# Patient Record
(demographics unavailable — no encounter records)

---

## 2024-10-09 NOTE — HISTORY OF PRESENT ILLNESS
[de-identified] : Update 10/10/24: f/u CRS and DNS s/p STESS 8/1/24. Rinsing QD. Previously recommended OTC antihistamine for eye itching.   Update 8/29/24: CRS and DNS s/p STESS 8/1/24. Rinsing TID. Reports frontal headaches but has not needed any pain medication. Good sense of smell. Breathing well. Has eye itching and dryness, was told by ophtho it was allergy. Not taking any allergy treatment. Path: 1.  Nasal cavity, septal contents, septoplasty - Cartilage and bone 2.  Paranasal sinus, left sinus contents, endoscopic sinus surgery - Respiratory mucosa with mild chronic inflammation 3. Paranasal sinus, right sinus contents, endoscopic sinus surgery - Respiratory mucosa with mild chronic inflammation  Update 8/8/24: s/p STESS 8/1/24. Reports appropriate post-op congestion. Denies pain, recent fevers or infections.   Pathology: pending  64M referred by Dr. Fuentes for CRS. Patient has hx of balloon sinuplasty in 2/2023 but no longer with relief. Patient has tried flonase, budesonide irrigations and OTC antihistamines without relief. CT reviewed showing diffuse pansinusitis, DNS and large luis carlos bullosa. He reports for the past 6 years it has progressively worsened. He has environmental allergies and breathes through his mouth.

## 2024-10-09 NOTE — REASON FOR VISIT
[Subsequent Evaluation] : a subsequent evaluation for [FreeTextEntry2] : referred by Dr. Fuentes for CRS

## 2024-10-09 NOTE — ASSESSMENT
[FreeTextEntry1] : 65M presenting with CRS, AR, DNS and R luis carlos s/p STESS 8/1/24, doing well.

## 2024-10-09 NOTE — PROCEDURE
[FreeTextEntry6] : Bilateral nasal cavity debridement (CPT 79930)  Indication: post op   Procedure:  There was expected post operative inflammation in both the right and left nasal cavity. Thick mucoid secretions and blood clot were suctioned  Left:  The septum is midline The inferior turbinate was well reduced/outfractured The MT was resected The max with a small crust, removed with suction adn forceps minimal edema along ethmoid skull base sphenoid with post obstructive mucus, suctioned The frontal outflow tract clear  Right:  The septum is midline The inferior turbinate was well reduced/outfractured The MT was resected The max with small crust, removed with suction and forceps, mild polypoid edema within sinus ethmoid with polypoid edema removed with suction sphenoid clear The frontal outflow tract clear

## 2024-10-24 NOTE — ASSESSMENT
[FreeTextEntry1] : 65M presenting with CRS, AR, DNS and R luis carlos s/p STESS 8/1/24, doing well. Also with tinntus.

## 2024-10-24 NOTE — PROCEDURE
[FreeTextEntry6] : Bilateral nasal cavity endoscopu  Indication: post op   Left:  The septum is midline The inferior turbinate was well reduced/outfractured The MT was resected The max clear ethmoid clear sphenoid clear The frontal outflow tract clear  Right:  The septum is midline The inferior turbinate was well reduced/outfractured The MT was resected The max clear ethmoid clear sphenoid clear The frontal outflow tract clear

## 2024-10-24 NOTE — PLAN
[TextEntry] : His sinuses look very good today. He does have some mild persistent congestion. We will begin budesonide irrigations BID.   In addition, he reports tinnitus L>R. Audio shows sloping mixed severe loss. I offered fu with otology and audiology but he is not interested at this time. We reviewed risks of unaided hearing loss.  Fu 2 months.

## 2024-10-24 NOTE — HISTORY OF PRESENT ILLNESS
[de-identified] : Update 10/24/24: f/u CRS and DNS s/p STESS 8/1/24. Rinsing QD. He is feeling well. Good sense of smell. Breathing well through nose, although does have some persistent congestion. Also with 3-4 years of L>R tinnitus.  Update 8/29/24: CRS and DNS s/p STESS 8/1/24. Rinsing TID. Reports frontal headaches but has not needed any pain medication. Good sense of smell. Breathing well. Has eye itching and dryness, was told by ophtho it was allergy. Not taking any allergy treatment. Path: 1.  Nasal cavity, septal contents, septoplasty - Cartilage and bone 2.  Paranasal sinus, left sinus contents, endoscopic sinus surgery - Respiratory mucosa with mild chronic inflammation 3. Paranasal sinus, right sinus contents, endoscopic sinus surgery - Respiratory mucosa with mild chronic inflammation  Update 8/8/24: s/p STESS 8/1/24. Reports appropriate post-op congestion. Denies pain, recent fevers or infections.   Pathology: pending  64M referred by Dr. Fuentes for CRS. Patient has hx of balloon sinuplasty in 2/2023 but no longer with relief. Patient has tried flonase, budesonide irrigations and OTC antihistamines without relief. CT reviewed showing diffuse pansinusitis, DNS and large luis carlos bullosa. He reports for the past 6 years it has progressively worsened. He has environmental allergies and breathes through his mouth.

## 2025-01-09 NOTE — PROCEDURE
[de-identified] : Vikas Ibarra [de-identified] : Bilateral nasal cavity endoscopy  Indication: post op   Left:  The septum is midline The inferior turbinate was well reduced/outfractured The MT was resected The max clear ethmoid clear sphenoid clear The frontal outflow tract clear  Right:  The septum is midline The inferior turbinate was well reduced/outfractured The MT was resected The max clear ethmoid clear sphenoid clear The frontal outflow tract clear

## 2025-01-09 NOTE — HISTORY OF PRESENT ILLNESS
[de-identified] : Update 1/9/25: f/u CRS and DNS s/p STESS 8/1/24. Was started on budesonide irrigations BID, which he did get from the pharmacy. He is irrigating with saline 1-2x daily. Good sense of smell, no infections. He reports tinnitus persists L>R. Reviewed audio from last visit. He cannot afford hearing aids.  Update 10/24/24: f/u CRS and DNS s/p STESS 8/1/24. Rinsing QD. He is feeling well. Good sense of smell. Breathing well through nose, although does have some persistent congestion. Also with 3-4 years of L>R tinnitus.  Update 8/29/24: CRS and DNS s/p STESS 8/1/24. Rinsing TID. Reports frontal headaches but has not needed any pain medication. Good sense of smell. Breathing well. Has eye itching and dryness, was told by ophtho it was allergy. Not taking any allergy treatment. Path: 1.  Nasal cavity, septal contents, septoplasty - Cartilage and bone 2.  Paranasal sinus, left sinus contents, endoscopic sinus surgery - Respiratory mucosa with mild chronic inflammation 3. Paranasal sinus, right sinus contents, endoscopic sinus surgery - Respiratory mucosa with mild chronic inflammation  Update 8/8/24: s/p STESS 8/1/24. Reports appropriate post-op congestion. Denies pain, recent fevers or infections.   Pathology: pending  64M referred by Dr. Fuentes for CRS. Patient has hx of balloon sinuplasty in 2/2023 but no longer with relief. Patient has tried flonase, budesonide irrigations and OTC antihistamines without relief. CT reviewed showing diffuse pansinusitis, DNS and large luis carlos bullosa. He reports for the past 6 years it has progressively worsened. He has environmental allergies and breathes through his mouth.

## 2025-01-09 NOTE — PHYSICAL EXAM
[Nasal Endoscopy Performed] : nasal endoscopy was performed, see procedure section for findings [Normal] : mucosa is normal [Midline] : trachea located in midline position [de-identified] : cerumen impaction cleared bilaterally

## 2025-01-09 NOTE — PHYSICAL EXAM
[Nasal Endoscopy Performed] : nasal endoscopy was performed, see procedure section for findings [Normal] : mucosa is normal [Midline] : trachea located in midline position [de-identified] : cerumen impaction cleared bilaterally

## 2025-01-09 NOTE — HISTORY OF PRESENT ILLNESS
1213-Patient tolerated treatment well, peripheral IV was flushed and removed, catheter tip intact. Discharged without complaints or S/S of adverse event. Instructed to call provider for any questions or concerns.      [de-identified] : Update 1/9/25: f/u CRS and DNS s/p STESS 8/1/24. Was started on budesonide irrigations BID, which he did get from the pharmacy. He is irrigating with saline 1-2x daily. Good sense of smell, no infections. He reports tinnitus persists L>R. Reviewed audio from last visit. He cannot afford hearing aids.  Update 10/24/24: f/u CRS and DNS s/p STESS 8/1/24. Rinsing QD. He is feeling well. Good sense of smell. Breathing well through nose, although does have some persistent congestion. Also with 3-4 years of L>R tinnitus.  Update 8/29/24: CRS and DNS s/p STESS 8/1/24. Rinsing TID. Reports frontal headaches but has not needed any pain medication. Good sense of smell. Breathing well. Has eye itching and dryness, was told by ophtho it was allergy. Not taking any allergy treatment. Path: 1.  Nasal cavity, septal contents, septoplasty - Cartilage and bone 2.  Paranasal sinus, left sinus contents, endoscopic sinus surgery - Respiratory mucosa with mild chronic inflammation 3. Paranasal sinus, right sinus contents, endoscopic sinus surgery - Respiratory mucosa with mild chronic inflammation  Update 8/8/24: s/p STESS 8/1/24. Reports appropriate post-op congestion. Denies pain, recent fevers or infections.   Pathology: pending  64M referred by Dr. Fuentes for CRS. Patient has hx of balloon sinuplasty in 2/2023 but no longer with relief. Patient has tried flonase, budesonide irrigations and OTC antihistamines without relief. CT reviewed showing diffuse pansinusitis, DNS and large luis carlos bullosa. He reports for the past 6 years it has progressively worsened. He has environmental allergies and breathes through his mouth.

## 2025-01-09 NOTE — PLAN
[TextEntry] : Continue with budesonide irrigations BID. If unable to obtain budesonide, can just use saline irrigations.   We again reviewed his audio. He would benefit form hearing aids, but cannot afford at this time.  Follow up in 6 months.

## 2025-01-09 NOTE — PROCEDURE
[de-identified] : Vikas Ibarra [de-identified] : Bilateral nasal cavity endoscopy  Indication: post op   Left:  The septum is midline The inferior turbinate was well reduced/outfractured The MT was resected The max clear ethmoid clear sphenoid clear The frontal outflow tract clear  Right:  The septum is midline The inferior turbinate was well reduced/outfractured The MT was resected The max clear ethmoid clear sphenoid clear The frontal outflow tract clear

## 2025-06-26 NOTE — PROCEDURE
[Anterior rhinoscopy insufficient to account for symptoms] : anterior rhinoscopy insufficient to account for symptoms [None] : none [Flexible Endoscope] : examined with the flexible endoscope [de-identified] : Vikas Ibarra [de-identified] : Bilateral nasal cavity endoscopy  Indication: post op   Left:  The septum is midline The inferior turbinate was well reduced/outfractured The MT was resected The max clear ethmoid clear sphenoid clear The frontal outflow tract clear  Right:  The septum is midline The inferior turbinate was well reduced/outfractured The MT was resected The max clear ethmoid clear sphenoid clear The frontal outflow tract clear [de-identified] : NPL: Nasopharynx clear without masses Base of tongue without masses or lesions Vallecula clear Pyriforms clear Epiglottis crisp TVFs mobile bilaterally  Mild interarytenoid edema Glottic airway patent

## 2025-06-26 NOTE — PLAN
[TextEntry] : He nose and sinuses look clear. I recommend he continue saline irrigations, flonase, and astelin.  With respect to his ongoing phlegm, I do not suspect PND. However, I do recommend a trial of atrovent. LPR may be contributing as well and recommend a trial of PPI. If he has persistent symptoms, will consider referfal to laryngology for consideration of neuromodulators.   He continues to defer HAE.   Follow up in 6-12 months.

## 2025-06-26 NOTE — PROCEDURE
[Anterior rhinoscopy insufficient to account for symptoms] : anterior rhinoscopy insufficient to account for symptoms [None] : none [Flexible Endoscope] : examined with the flexible endoscope [de-identified] : Vikas Ibarra [de-identified] : Bilateral nasal cavity endoscopy  Indication: post op   Left:  The septum is midline The inferior turbinate was well reduced/outfractured The MT was resected The max clear ethmoid clear sphenoid clear The frontal outflow tract clear  Right:  The septum is midline The inferior turbinate was well reduced/outfractured The MT was resected The max clear ethmoid clear sphenoid clear The frontal outflow tract clear [de-identified] : NPL: Nasopharynx clear without masses Base of tongue without masses or lesions Vallecula clear Pyriforms clear Epiglottis crisp TVFs mobile bilaterally  Mild interarytenoid edema Glottic airway patent

## 2025-06-26 NOTE — PHYSICAL EXAM
[Nasal Endoscopy Performed] : nasal endoscopy was performed, see procedure section for findings [Normal] : mucosa is normal [Midline] : trachea located in midline position [de-identified] : cerumen impaction cleared bilaterally

## 2025-06-26 NOTE — HISTORY OF PRESENT ILLNESS
[de-identified] : Update 6/26/25: f/u CRS and DNS s/p STESS 8/1/24. At last visit was noted to have clear sinuses and was advised to continue budesonide irrigations. He is currently only irrigating with saline, but using flonase and astelin daily. He is using loratadine. He reports throat phlegm/PND(?). Denies heartburn. Reports phlegm consistently bothersome at all times of day. No inciting factors. He is breathing well through his nose. He reports good sense of smell/taste. He remains disinterested in HAE.  Update 1/9/25: f/u CRS and DNS s/p STESS 8/1/24. Was started on budesonide irrigations BID, which he did get from the pharmacy. He is irrigating with saline 1-2x daily. Good sense of smell, no infections. He reports tinnitus persists L>R. Reviewed audio from last visit. He cannot afford hearing aids.  Update 10/24/24: f/u CRS and DNS s/p STESS 8/1/24. Rinsing QD. He is feeling well. Good sense of smell. Breathing well through nose, although does have some persistent congestion. Also with 3-4 years of L>R tinnitus.  Update 8/29/24: CRS and DNS s/p STESS 8/1/24. Rinsing TID. Reports frontal headaches but has not needed any pain medication. Good sense of smell. Breathing well. Has eye itching and dryness, was told by ophtho it was allergy. Not taking any allergy treatment. Path: 1.  Nasal cavity, septal contents, septoplasty - Cartilage and bone 2.  Paranasal sinus, left sinus contents, endoscopic sinus surgery - Respiratory mucosa with mild chronic inflammation 3. Paranasal sinus, right sinus contents, endoscopic sinus surgery - Respiratory mucosa with mild chronic inflammation  Update 8/8/24: s/p STESS 8/1/24. Reports appropriate post-op congestion. Denies pain, recent fevers or infections.   Pathology: pending  64M referred by Dr. Fuentes for CRS. Patient has hx of balloon sinuplasty in 2/2023 but no longer with relief. Patient has tried flonase, budesonide irrigations and OTC antihistamines without relief. CT reviewed showing diffuse pansinusitis, DNS and large luis carlos bullosa. He reports for the past 6 years it has progressively worsened. He has environmental allergies and breathes through his mouth.

## 2025-06-26 NOTE — PHYSICAL EXAM
[Nasal Endoscopy Performed] : nasal endoscopy was performed, see procedure section for findings [Normal] : mucosa is normal [Midline] : trachea located in midline position [de-identified] : cerumen impaction cleared bilaterally

## 2025-06-26 NOTE — HISTORY OF PRESENT ILLNESS
[de-identified] : Update 6/26/25: f/u CRS and DNS s/p STESS 8/1/24. At last visit was noted to have clear sinuses and was advised to continue budesonide irrigations. He is currently only irrigating with saline, but using flonase and astelin daily. He is using loratadine. He reports throat phlegm/PND(?). Denies heartburn. Reports phlegm consistently bothersome at all times of day. No inciting factors. He is breathing well through his nose. He reports good sense of smell/taste. He remains disinterested in HAE.  Update 1/9/25: f/u CRS and DNS s/p STESS 8/1/24. Was started on budesonide irrigations BID, which he did get from the pharmacy. He is irrigating with saline 1-2x daily. Good sense of smell, no infections. He reports tinnitus persists L>R. Reviewed audio from last visit. He cannot afford hearing aids.  Update 10/24/24: f/u CRS and DNS s/p STESS 8/1/24. Rinsing QD. He is feeling well. Good sense of smell. Breathing well through nose, although does have some persistent congestion. Also with 3-4 years of L>R tinnitus.  Update 8/29/24: CRS and DNS s/p STESS 8/1/24. Rinsing TID. Reports frontal headaches but has not needed any pain medication. Good sense of smell. Breathing well. Has eye itching and dryness, was told by ophtho it was allergy. Not taking any allergy treatment. Path: 1.  Nasal cavity, septal contents, septoplasty - Cartilage and bone 2.  Paranasal sinus, left sinus contents, endoscopic sinus surgery - Respiratory mucosa with mild chronic inflammation 3. Paranasal sinus, right sinus contents, endoscopic sinus surgery - Respiratory mucosa with mild chronic inflammation  Update 8/8/24: s/p STESS 8/1/24. Reports appropriate post-op congestion. Denies pain, recent fevers or infections.   Pathology: pending  64M referred by Dr. Fuentes for CRS. Patient has hx of balloon sinuplasty in 2/2023 but no longer with relief. Patient has tried flonase, budesonide irrigations and OTC antihistamines without relief. CT reviewed showing diffuse pansinusitis, DNS and large luis carlos bullosa. He reports for the past 6 years it has progressively worsened. He has environmental allergies and breathes through his mouth.

## 2025-07-29 NOTE — PROCEDURE
[Anterior rhinoscopy insufficient to account for symptoms] : anterior rhinoscopy insufficient to account for symptoms [None] : none [Flexible Endoscope] : examined with the flexible endoscope [de-identified] : Vikas Ibarra [de-identified] : Bilateral nasal cavity endoscopy  Indication: post op   Left:  The septum is midline The inferior turbinate was well reduced/outfractured The MT was resected The max clear ethmoid clear sphenoid clear The frontal outflow tract clear  Right:  The septum is midline The inferior turbinate was well reduced/outfractured The MT was resected The max clear ethmoid clear sphenoid clear The frontal outflow tract clear [de-identified] : NPL: Nasopharynx clear without masses Base of tongue without masses or lesions Vallecula clear Pyriforms clear Epiglottis crisp TVFs mobile bilaterally  Mild interarytenoid edema Glottic airway patent

## 2025-07-29 NOTE — PHYSICAL EXAM
[de-identified] : cerumen impaction cleared bilaterally  [Nasal Endoscopy Performed] : nasal endoscopy was performed, see procedure section for findings [Normal] : mucosa is normal [Midline] : trachea located in midline position

## 2025-07-29 NOTE — PHYSICAL EXAM
[de-identified] : cerumen impaction cleared bilaterally  [Nasal Endoscopy Performed] : nasal endoscopy was performed, see procedure section for findings [Normal] : mucosa is normal [Midline] : trachea located in midline position

## 2025-07-29 NOTE — PROCEDURE
[Anterior rhinoscopy insufficient to account for symptoms] : anterior rhinoscopy insufficient to account for symptoms [None] : none [Flexible Endoscope] : examined with the flexible endoscope [de-identified] : Vikas Ibarra [de-identified] : Bilateral nasal cavity endoscopy  Indication: post op   Left:  The septum is midline The inferior turbinate was well reduced/outfractured The MT was resected The max clear ethmoid clear sphenoid clear The frontal outflow tract clear  Right:  The septum is midline The inferior turbinate was well reduced/outfractured The MT was resected The max clear ethmoid clear sphenoid clear The frontal outflow tract clear [de-identified] : NPL: Nasopharynx clear without masses Base of tongue without masses or lesions Vallecula clear Pyriforms clear Epiglottis crisp TVFs mobile bilaterally  Mild interarytenoid edema Glottic airway patent

## 2025-07-29 NOTE — HISTORY OF PRESENT ILLNESS
[de-identified] : Update 7/31/25: f/u CRS and DNS s/p STESS 8/1/24. Previously started on PPI and atrovent for possible PND  Update 6/26/25: f/u CRS and DNS s/p STESS 8/1/24. At last visit was noted to have clear sinuses and was advised to continue budesonide irrigations. He is currently only irrigating with saline, but using flonase and astelin daily. He is using loratadine. He reports throat phlegm/PND(?). Denies heartburn. Reports phlegm consistently bothersome at all times of day. No inciting factors. He is breathing well through his nose. He reports good sense of smell/taste. He remains disinterested in HAE.  Update 1/9/25: f/u CRS and DNS s/p STESS 8/1/24. Was started on budesonide irrigations BID, which he did get from the pharmacy. He is irrigating with saline 1-2x daily. Good sense of smell, no infections. He reports tinnitus persists L>R. Reviewed audio from last visit. He cannot afford hearing aids.  Update 10/24/24: f/u CRS and DNS s/p STESS 8/1/24. Rinsing QD. He is feeling well. Good sense of smell. Breathing well through nose, although does have some persistent congestion. Also with 3-4 years of L>R tinnitus.  Update 8/29/24: CRS and DNS s/p STESS 8/1/24. Rinsing TID. Reports frontal headaches but has not needed any pain medication. Good sense of smell. Breathing well. Has eye itching and dryness, was told by ophtho it was allergy. Not taking any allergy treatment. Path: 1.  Nasal cavity, septal contents, septoplasty - Cartilage and bone 2.  Paranasal sinus, left sinus contents, endoscopic sinus surgery - Respiratory mucosa with mild chronic inflammation 3. Paranasal sinus, right sinus contents, endoscopic sinus surgery - Respiratory mucosa with mild chronic inflammation  Update 8/8/24: s/p STESS 8/1/24. Reports appropriate post-op congestion. Denies pain, recent fevers or infections.   Pathology: pending  64M referred by Dr. Fuentes for CRS. Patient has hx of balloon sinuplasty in 2/2023 but no longer with relief. Patient has tried flonase, budesonide irrigations and OTC antihistamines without relief. CT reviewed showing diffuse pansinusitis, DNS and large luis carlos bullosa. He reports for the past 6 years it has progressively worsened. He has environmental allergies and breathes through his mouth.

## 2025-07-29 NOTE — HISTORY OF PRESENT ILLNESS
[de-identified] : Update 7/31/25: f/u CRS and DNS s/p STESS 8/1/24. Previously started on PPI and atrovent for possible PND  Update 6/26/25: f/u CRS and DNS s/p STESS 8/1/24. At last visit was noted to have clear sinuses and was advised to continue budesonide irrigations. He is currently only irrigating with saline, but using flonase and astelin daily. He is using loratadine. He reports throat phlegm/PND(?). Denies heartburn. Reports phlegm consistently bothersome at all times of day. No inciting factors. He is breathing well through his nose. He reports good sense of smell/taste. He remains disinterested in HAE.  Update 1/9/25: f/u CRS and DNS s/p STESS 8/1/24. Was started on budesonide irrigations BID, which he did get from the pharmacy. He is irrigating with saline 1-2x daily. Good sense of smell, no infections. He reports tinnitus persists L>R. Reviewed audio from last visit. He cannot afford hearing aids.  Update 10/24/24: f/u CRS and DNS s/p STESS 8/1/24. Rinsing QD. He is feeling well. Good sense of smell. Breathing well through nose, although does have some persistent congestion. Also with 3-4 years of L>R tinnitus.  Update 8/29/24: CRS and DNS s/p STESS 8/1/24. Rinsing TID. Reports frontal headaches but has not needed any pain medication. Good sense of smell. Breathing well. Has eye itching and dryness, was told by ophtho it was allergy. Not taking any allergy treatment. Path: 1.  Nasal cavity, septal contents, septoplasty - Cartilage and bone 2.  Paranasal sinus, left sinus contents, endoscopic sinus surgery - Respiratory mucosa with mild chronic inflammation 3. Paranasal sinus, right sinus contents, endoscopic sinus surgery - Respiratory mucosa with mild chronic inflammation  Update 8/8/24: s/p STESS 8/1/24. Reports appropriate post-op congestion. Denies pain, recent fevers or infections.   Pathology: pending  64M referred by Dr. Fuentes for CRS. Patient has hx of balloon sinuplasty in 2/2023 but no longer with relief. Patient has tried flonase, budesonide irrigations and OTC antihistamines without relief. CT reviewed showing diffuse pansinusitis, DNS and large luis carlos bullosa. He reports for the past 6 years it has progressively worsened. He has environmental allergies and breathes through his mouth.